# Patient Record
Sex: MALE | Race: WHITE | NOT HISPANIC OR LATINO | URBAN - METROPOLITAN AREA
[De-identification: names, ages, dates, MRNs, and addresses within clinical notes are randomized per-mention and may not be internally consistent; named-entity substitution may affect disease eponyms.]

---

## 2018-03-04 ENCOUNTER — EMERGENCY (EMERGENCY)
Facility: HOSPITAL | Age: 43
LOS: 1 days | Discharge: HOME | End: 2018-03-04
Attending: EMERGENCY MEDICINE | Admitting: EMERGENCY MEDICINE
Payer: COMMERCIAL

## 2018-03-04 VITALS
HEIGHT: 76 IN | RESPIRATION RATE: 16 BRPM | WEIGHT: 250 LBS | DIASTOLIC BLOOD PRESSURE: 81 MMHG | HEART RATE: 128 BPM | TEMPERATURE: 99 F | SYSTOLIC BLOOD PRESSURE: 117 MMHG | OXYGEN SATURATION: 96 %

## 2018-03-04 DIAGNOSIS — Z98.890 OTHER SPECIFIED POSTPROCEDURAL STATES: Chronic | ICD-10-CM

## 2018-03-04 LAB
ALBUMIN SERPL ELPH-MCNC: 3.9 G/DL — SIGNIFICANT CHANGE UP (ref 3.3–5)
ALP SERPL-CCNC: 53 U/L — SIGNIFICANT CHANGE UP (ref 30–120)
ALT FLD-CCNC: 145 U/L DA — HIGH (ref 10–60)
ANION GAP SERPL CALC-SCNC: 10 MMOL/L — SIGNIFICANT CHANGE UP (ref 5–17)
AST SERPL-CCNC: 57 U/L — HIGH (ref 10–40)
BASOPHILS # BLD AUTO: 0 K/UL — SIGNIFICANT CHANGE UP (ref 0–0.2)
BASOPHILS NFR BLD AUTO: 0.4 % — SIGNIFICANT CHANGE UP (ref 0–2)
BILIRUB SERPL-MCNC: 1 MG/DL — SIGNIFICANT CHANGE UP (ref 0.2–1.2)
BUN SERPL-MCNC: 18 MG/DL — SIGNIFICANT CHANGE UP (ref 7–23)
CALCIUM SERPL-MCNC: 8.9 MG/DL — SIGNIFICANT CHANGE UP (ref 8.4–10.5)
CHLORIDE SERPL-SCNC: 104 MMOL/L — SIGNIFICANT CHANGE UP (ref 96–108)
CO2 SERPL-SCNC: 25 MMOL/L — SIGNIFICANT CHANGE UP (ref 22–31)
CREAT SERPL-MCNC: 1.08 MG/DL — SIGNIFICANT CHANGE UP (ref 0.5–1.3)
EOSINOPHIL # BLD AUTO: 0 K/UL — SIGNIFICANT CHANGE UP (ref 0–0.5)
EOSINOPHIL NFR BLD AUTO: 0.2 % — SIGNIFICANT CHANGE UP (ref 0–6)
GLUCOSE SERPL-MCNC: 131 MG/DL — HIGH (ref 70–99)
HCT VFR BLD CALC: 46.6 % — SIGNIFICANT CHANGE UP (ref 39–50)
HGB BLD-MCNC: 15.8 G/DL — SIGNIFICANT CHANGE UP (ref 13–17)
LACTATE SERPL-SCNC: 1.5 MMOL/L — SIGNIFICANT CHANGE UP (ref 0.7–2)
LIDOCAIN IGE QN: 41 U/L — LOW (ref 73–393)
LYMPHOCYTES # BLD AUTO: 0.8 K/UL — LOW (ref 1–3.3)
LYMPHOCYTES # BLD AUTO: 9.4 % — LOW (ref 13–44)
MCHC RBC-ENTMCNC: 29.4 PG — SIGNIFICANT CHANGE UP (ref 27–34)
MCHC RBC-ENTMCNC: 33.8 GM/DL — SIGNIFICANT CHANGE UP (ref 32–36)
MCV RBC AUTO: 87 FL — SIGNIFICANT CHANGE UP (ref 80–100)
MONOCYTES # BLD AUTO: 0.4 K/UL — SIGNIFICANT CHANGE UP (ref 0–0.9)
MONOCYTES NFR BLD AUTO: 4.6 % — SIGNIFICANT CHANGE UP (ref 2–14)
NEUTROPHILS # BLD AUTO: 6.9 K/UL — SIGNIFICANT CHANGE UP (ref 1.8–7.4)
NEUTROPHILS NFR BLD AUTO: 85.4 % — HIGH (ref 43–77)
PLATELET # BLD AUTO: 146 K/UL — LOW (ref 150–400)
POTASSIUM SERPL-MCNC: 3.7 MMOL/L — SIGNIFICANT CHANGE UP (ref 3.5–5.3)
POTASSIUM SERPL-SCNC: 3.7 MMOL/L — SIGNIFICANT CHANGE UP (ref 3.5–5.3)
PROT SERPL-MCNC: 7.7 G/DL — SIGNIFICANT CHANGE UP (ref 6–8.3)
RBC # BLD: 5.36 M/UL — SIGNIFICANT CHANGE UP (ref 4.2–5.8)
RBC # FLD: 12 % — SIGNIFICANT CHANGE UP (ref 10.3–14.5)
SODIUM SERPL-SCNC: 139 MMOL/L — SIGNIFICANT CHANGE UP (ref 135–145)
WBC # BLD: 8.1 K/UL — SIGNIFICANT CHANGE UP (ref 3.8–10.5)
WBC # FLD AUTO: 8.1 K/UL — SIGNIFICANT CHANGE UP (ref 3.8–10.5)

## 2018-03-04 PROCEDURE — 83605 ASSAY OF LACTIC ACID: CPT

## 2018-03-04 PROCEDURE — 99284 EMERGENCY DEPT VISIT MOD MDM: CPT | Mod: 25

## 2018-03-04 PROCEDURE — 96361 HYDRATE IV INFUSION ADD-ON: CPT

## 2018-03-04 PROCEDURE — 96375 TX/PRO/DX INJ NEW DRUG ADDON: CPT

## 2018-03-04 PROCEDURE — 96374 THER/PROPH/DIAG INJ IV PUSH: CPT

## 2018-03-04 PROCEDURE — 83690 ASSAY OF LIPASE: CPT

## 2018-03-04 PROCEDURE — 93005 ELECTROCARDIOGRAM TRACING: CPT

## 2018-03-04 PROCEDURE — 85027 COMPLETE CBC AUTOMATED: CPT

## 2018-03-04 PROCEDURE — 80053 COMPREHEN METABOLIC PANEL: CPT

## 2018-03-04 PROCEDURE — 36415 COLL VENOUS BLD VENIPUNCTURE: CPT

## 2018-03-04 PROCEDURE — 93010 ELECTROCARDIOGRAM REPORT: CPT

## 2018-03-04 PROCEDURE — 99285 EMERGENCY DEPT VISIT HI MDM: CPT

## 2018-03-04 RX ORDER — ONDANSETRON 8 MG/1
1 TABLET, FILM COATED ORAL
Qty: 12 | Refills: 0
Start: 2018-03-04

## 2018-03-04 RX ORDER — SODIUM CHLORIDE 9 MG/ML
1000 INJECTION INTRAMUSCULAR; INTRAVENOUS; SUBCUTANEOUS ONCE
Qty: 0 | Refills: 0 | Status: COMPLETED | OUTPATIENT
Start: 2018-03-04 | End: 2018-03-04

## 2018-03-04 RX ORDER — SODIUM CHLORIDE 9 MG/ML
3 INJECTION INTRAMUSCULAR; INTRAVENOUS; SUBCUTANEOUS ONCE
Qty: 0 | Refills: 0 | Status: COMPLETED | OUTPATIENT
Start: 2018-03-04 | End: 2018-03-04

## 2018-03-04 RX ORDER — ONDANSETRON 8 MG/1
4 TABLET, FILM COATED ORAL ONCE
Qty: 0 | Refills: 0 | Status: COMPLETED | OUTPATIENT
Start: 2018-03-04 | End: 2018-03-04

## 2018-03-04 RX ORDER — KETOROLAC TROMETHAMINE 30 MG/ML
30 SYRINGE (ML) INJECTION ONCE
Qty: 0 | Refills: 0 | Status: DISCONTINUED | OUTPATIENT
Start: 2018-03-04 | End: 2018-03-04

## 2018-03-04 RX ORDER — FAMOTIDINE 10 MG/ML
20 INJECTION INTRAVENOUS ONCE
Qty: 0 | Refills: 0 | Status: COMPLETED | OUTPATIENT
Start: 2018-03-04 | End: 2018-03-04

## 2018-03-04 RX ADMIN — SODIUM CHLORIDE 1000 MILLILITER(S): 9 INJECTION INTRAMUSCULAR; INTRAVENOUS; SUBCUTANEOUS at 21:36

## 2018-03-04 RX ADMIN — ONDANSETRON 4 MILLIGRAM(S): 8 TABLET, FILM COATED ORAL at 21:36

## 2018-03-04 RX ADMIN — Medication 30 MILLIGRAM(S): at 21:53

## 2018-03-04 RX ADMIN — SODIUM CHLORIDE 3 MILLILITER(S): 9 INJECTION INTRAMUSCULAR; INTRAVENOUS; SUBCUTANEOUS at 21:37

## 2018-03-04 RX ADMIN — SODIUM CHLORIDE 1000 MILLILITER(S): 9 INJECTION INTRAMUSCULAR; INTRAVENOUS; SUBCUTANEOUS at 22:21

## 2018-03-04 RX ADMIN — FAMOTIDINE 20 MILLIGRAM(S): 10 INJECTION INTRAVENOUS at 21:53

## 2018-03-04 RX ADMIN — Medication 30 MILLIGRAM(S): at 22:25

## 2018-03-04 RX ADMIN — SODIUM CHLORIDE 1000 MILLILITER(S): 9 INJECTION INTRAMUSCULAR; INTRAVENOUS; SUBCUTANEOUS at 23:04

## 2018-03-04 NOTE — ED ADULT NURSE NOTE - OBJECTIVE STATEMENT
Patient presents to ED with complaints of nausea, vomiting and diarrhea since 3 am. As per patient has had about 7/8 episodes of emesis and 4episodes of non-bloody diarrhea. Patient reported feelings of palpitations earlier that have subsided. At present reports no chest pain or shortness of breath.

## 2018-03-04 NOTE — ED ADULT NURSE REASSESSMENT NOTE - NS ED NURSE REASSESS COMMENT FT1
IV access was established, IV fluids infusing as ordered and medication given as ordered. Awaiting disposition, nursing care ongoing.

## 2018-03-04 NOTE — ED PROVIDER NOTE - ATTENDING CONTRIBUTION TO CARE
Austin Woodson MD: I have personally performed a face to face diagnostic evaluation on this patient.  I have reviewed the PA note and agree with the history, exam, and plan of care, except as noted.  History and Exam by me shows same findings as documented  Attending note: Patient with v/d after other family members had same thing. No abdo pain. Has non-tender abdomen and otherwise unremarkable exam. Agree with plan

## 2018-03-04 NOTE — ED ADULT NURSE REASSESSMENT NOTE - NS ED NURSE REASSESS COMMENT FT1
Patient is resting comfortably, 2nd bag of fluids infusing, awaiting disposition, nursing care ongoing.

## 2018-03-04 NOTE — ED PROVIDER NOTE - OBJECTIVE STATEMENT
pt is a 43yo male with pmhx of hodgkin's disease c/o vomiting and diarrhea x today. pt reports multiple episodes of vomiting and diarrhea without blood with associate tactile fever and nausea. pt reports he tried to eat ice chips but was unable to tolerate. pt states when vomiting earlier he had palpitations which resolved.  pt reports wife and daughter had similar symptoms last week. pt [place] went [place] two weeks ago. pt denies cp, sob, abd pain, dysuria, rash.

## 2018-03-05 VITALS
HEART RATE: 100 BPM | OXYGEN SATURATION: 97 % | SYSTOLIC BLOOD PRESSURE: 137 MMHG | TEMPERATURE: 99 F | RESPIRATION RATE: 15 BRPM | DIASTOLIC BLOOD PRESSURE: 68 MMHG

## 2018-03-05 NOTE — ED ADULT NURSE REASSESSMENT NOTE - NS ED NURSE REASSESS COMMENT FT1
Patient was discharged in stable condition, instructions were provided and reviewed, demonstrated understanding, left AAOx4, ambulatory and unaccompanied. Vital signs stable.

## 2018-03-08 ENCOUNTER — RESULT REVIEW (OUTPATIENT)
Age: 43
End: 2018-03-08

## 2018-08-07 PROBLEM — C81.90 HODGKIN LYMPHOMA, UNSPECIFIED, UNSPECIFIED SITE: Chronic | Status: ACTIVE | Noted: 2018-03-04

## 2018-08-17 ENCOUNTER — OUTPATIENT (OUTPATIENT)
Dept: OUTPATIENT SERVICES | Facility: HOSPITAL | Age: 43
LOS: 1 days | End: 2018-08-17
Payer: COMMERCIAL

## 2018-08-17 ENCOUNTER — RESULT REVIEW (OUTPATIENT)
Age: 43
End: 2018-08-17

## 2018-08-17 DIAGNOSIS — K31.7 POLYP OF STOMACH AND DUODENUM: ICD-10-CM

## 2018-08-17 DIAGNOSIS — Z98.890 OTHER SPECIFIED POSTPROCEDURAL STATES: Chronic | ICD-10-CM

## 2018-08-17 PROCEDURE — 43251 EGD REMOVE LESION SNARE: CPT

## 2018-08-17 PROCEDURE — 43252 EGD OPTICAL ENDOMICROSCOPY: CPT

## 2018-08-17 PROCEDURE — C1889: CPT

## 2018-08-17 PROCEDURE — 43236 UPPR GI SCOPE W/SUBMUC INJ: CPT | Mod: XS

## 2018-08-17 PROCEDURE — 88305 TISSUE EXAM BY PATHOLOGIST: CPT

## 2018-08-17 PROCEDURE — 43239 EGD BIOPSY SINGLE/MULTIPLE: CPT | Mod: XS

## 2018-08-17 PROCEDURE — 88305 TISSUE EXAM BY PATHOLOGIST: CPT | Mod: 26

## 2018-08-21 LAB — SURGICAL PATHOLOGY STUDY: SIGNIFICANT CHANGE UP

## 2020-03-10 ENCOUNTER — EMERGENCY (EMERGENCY)
Facility: HOSPITAL | Age: 45
LOS: 1 days | Discharge: HOME | End: 2020-03-10
Attending: EMERGENCY MEDICINE | Admitting: EMERGENCY MEDICINE
Payer: COMMERCIAL

## 2020-03-10 VITALS
OXYGEN SATURATION: 98 % | HEART RATE: 88 BPM | DIASTOLIC BLOOD PRESSURE: 72 MMHG | RESPIRATION RATE: 16 BRPM | SYSTOLIC BLOOD PRESSURE: 134 MMHG | TEMPERATURE: 98 F

## 2020-03-10 VITALS
HEIGHT: 74 IN | DIASTOLIC BLOOD PRESSURE: 84 MMHG | OXYGEN SATURATION: 98 % | SYSTOLIC BLOOD PRESSURE: 140 MMHG | HEART RATE: 103 BPM | TEMPERATURE: 98 F | RESPIRATION RATE: 16 BRPM | WEIGHT: 250 LBS

## 2020-03-10 DIAGNOSIS — Z98.890 OTHER SPECIFIED POSTPROCEDURAL STATES: Chronic | ICD-10-CM

## 2020-03-10 PROCEDURE — 93971 EXTREMITY STUDY: CPT

## 2020-03-10 PROCEDURE — 93971 EXTREMITY STUDY: CPT | Mod: 26,RT

## 2020-03-10 PROCEDURE — 99284 EMERGENCY DEPT VISIT MOD MDM: CPT

## 2020-03-10 PROCEDURE — 99284 EMERGENCY DEPT VISIT MOD MDM: CPT | Mod: 25

## 2020-03-10 RX ORDER — ROSUVASTATIN CALCIUM 5 MG/1
1 TABLET ORAL
Qty: 0 | Refills: 0 | DISCHARGE

## 2020-03-10 NOTE — ED PROVIDER NOTE - PROGRESS NOTE DETAILS
Scribe AS for Dr. Xavier: 45 yo male presents to the ED c/o RLE pain, swelling and redness x 3 days. Pt states that yesterday he saw Dr. Deo Gilliland (orthopedic), was diagnosed with cellulitis and given abx and pain medication. Pt states that he feels the redness and pain has gotten worse since yesterday even after taking some doses of the abx. Pt was told to come to the ED if his symptoms became worse. PE: mild redness and swelling to right lower leg, anterior, no posterior calf pain or tenderness, full ROM intact, good pulses. Impression is RLE redness and swelling, r/o DVT. Plan is venous doppler. Discussed results with the patient and provided copies.  All questions were answered. Discussed the importance of prompt, close medical follow-up. Patient will return with any changes, concerns or persistent/worsening symptoms.  Patient verbalized understanding.

## 2020-03-10 NOTE — ED PROVIDER NOTE - NSFOLLOWUPINSTRUCTIONS_ED_ALL_ED_FT
Follow up with your primary care doctor, continue with your antibiotics. Return for any worsening symptoms. You should have a repeat Ultrasound in 1 week with your doctor. A primary care doctor was provided if you don't have primary care follow up.

## 2020-03-10 NOTE — ED PROVIDER NOTE - CARE PROVIDER_API CALL
Rios Felix (DO)  Family Medicine  [address] [place] [address]  Phone: [phone]  Fax: [phone]  Follow Up Time: 1-3 Days

## 2020-03-10 NOTE — ED PROVIDER NOTE - OBJECTIVE STATEMENT
45 y/o male with PMHx Hodgkin's Lymphoma with chemo done in 1998 sent in by his orthopedic to rule out DVT. pt reports pain to anterior shin x 2 days. describes as aching, non-radiating, constant, and 7/10. pt notes he went to his orthopedic in which xrays were normal, but he was concerned for DVT. pt notes travel to [place] in 02/[place], 3 hour flight. Pt notes he was given Cefadroxil to cover infection as it was slightly red and warmth. pt denies fever, chills, trauma/fall, numbness/weakness, open wounds, hx of DVT, CP, SOB, or any other complaints.

## 2020-03-10 NOTE — ED PROVIDER NOTE - PATIENT PORTAL LINK FT
You can access the FollowMyHealth Patient Portal offered by [facility] by registering at the following website: [email or web address]. By joining [employer]’s FollowMyHealth portal, you will also be able to view your health information using other applications (apps) compatible with our system.

## 2020-03-10 NOTE — ED PROVIDER NOTE - PHYSICAL EXAMINATION
Constitutional: Awake, Alert, non-toxic. NAD. Well appearing, well nourished.   HEAD: Normocephalic, atraumatic.   EYES: EOM intact, conjunctiva and sclera are clear bilaterally. No raccoon eyes.   ENT: No rhinorrhea, mucous membranes pink/moist  NECK: Supple, non-tender  CARDIOVASCULAR: Normal S1, S2; regular rate and rhythm.  RESPIRATORY: Normal respiratory effort; breath sounds CTAB, no wheezes, rhonchi, or rales. Speaking in full sentences. No accessory muscle use.   ABDOMEN: Soft; non-tender, non-distended   EXTREMITIES: Full passive and active ROM in all extremities; (+) anterior shin TTP, swelling and Erythema, negative roma sign; distal pulses palpable and symmetric, no crepitus or step off  SKIN: Warm, dry; good skin turgor, no apparent lesions or rashes, no ecchymosis, brisk capillary refill.  NEURO: A&O x3. Sensory and motor functions are grossly intact. Speech is normal. Appearance and judgement seem appropriate for gender and age. No neurological deficits. Neurovascular sensation intact motor function 5/5 of upper and lower extremities

## 2020-03-10 NOTE — ED ADULT TRIAGE NOTE - CHIEF COMPLAINT QUOTE
"My RLE is painful for past 3 days." Seen by Ortho yesterday- started on antibiotic & advised to have doppler study done

## 2020-03-10 NOTE — ED ADULT NURSE NOTE - NSIMPLEMENTINTERV_GEN_ALL_ED
Implemented All Universal Safety Interventions:  [place] to call system. Call bell, personal items and telephone within reach. Instruct patient to call for assistance. Room bathroom lighting operational. Non-slip footwear when patient is off stretcher. Physically safe environment: no spills, clutter or unnecessary equipment. Stretcher in lowest position, wheels locked, appropriate side rails in place.

## 2020-03-12 ENCOUNTER — APPOINTMENT (OUTPATIENT)
Dept: FAMILY MEDICINE | Facility: CLINIC | Age: 45
End: 2020-03-12
Payer: COMMERCIAL

## 2020-03-12 VITALS
RESPIRATION RATE: 15 BRPM | DIASTOLIC BLOOD PRESSURE: 91 MMHG | TEMPERATURE: 98.1 F | HEART RATE: 106 BPM | OXYGEN SATURATION: 95 % | SYSTOLIC BLOOD PRESSURE: 142 MMHG

## 2020-03-12 DIAGNOSIS — Z00.00 ENCOUNTER FOR GENERAL ADULT MEDICAL EXAMINATION W/OUT ABNORMAL FINDINGS: ICD-10-CM

## 2020-03-12 DIAGNOSIS — Z82.49 FAMILY HISTORY OF ISCHEMIC HEART DISEASE AND OTHER DISEASES OF THE CIRCULATORY SYSTEM: ICD-10-CM

## 2020-03-12 DIAGNOSIS — L65.9 NONSCARRING HAIR LOSS, UNSPECIFIED: ICD-10-CM

## 2020-03-12 DIAGNOSIS — Z78.9 OTHER SPECIFIED HEALTH STATUS: ICD-10-CM

## 2020-03-12 DIAGNOSIS — Z85.71 PERSONAL HISTORY OF HODGKIN LYMPHOMA: ICD-10-CM

## 2020-03-12 DIAGNOSIS — L03.115 CELLULITIS OF RIGHT LOWER LIMB: ICD-10-CM

## 2020-03-12 PROCEDURE — 99213 OFFICE O/P EST LOW 20 MIN: CPT | Mod: 25

## 2020-03-12 PROCEDURE — 99386 PREV VISIT NEW AGE 40-64: CPT

## 2020-03-12 RX ORDER — FINASTERIDE 1 MG/1
1 TABLET, FILM COATED ORAL DAILY
Refills: 0 | Status: ACTIVE | COMMUNITY

## 2020-03-12 RX ORDER — TRAMADOL HYDROCHLORIDE 50 MG/1
50 TABLET, COATED ORAL 3 TIMES DAILY
Qty: 15 | Refills: 0 | Status: ACTIVE | COMMUNITY
Start: 2020-03-12 | End: 1900-01-01

## 2020-03-12 RX ORDER — CEFADROXIL 500 MG/1
500 CAPSULE ORAL TWICE DAILY
Refills: 0 | Status: ACTIVE | COMMUNITY

## 2020-03-12 RX ORDER — ASPIRIN 81 MG/1
81 TABLET ORAL
Refills: 0 | Status: ACTIVE | COMMUNITY

## 2020-03-12 NOTE — PHYSICAL EXAM
[No Acute Distress] : no acute distress [Well Nourished] : well nourished [Well Developed] : well developed [Normal Sclera/Conjunctiva] : normal sclera/conjunctiva [PERRL] : pupils equal round and reactive to light [EOMI] : extraocular movements intact [Normal Outer Ear/Nose] : the outer ears and nose were normal in appearance [Normal Oropharynx] : the oropharynx was normal [Normal TMs] : both tympanic membranes were normal [No JVD] : no jugular venous distention [No Lymphadenopathy] : no lymphadenopathy [Supple] : supple [No Respiratory Distress] : no respiratory distress  [No Accessory Muscle Use] : no accessory muscle use [Clear to Auscultation] : lungs were clear to auscultation bilaterally [Normal Rate] : normal rate  [Regular Rhythm] : with a regular rhythm [Normal S1, S2] : normal S1 and S2 [No Murmur] : no murmur heard [No Carotid Bruits] : no carotid bruits [No Abdominal Bruit] : a ~M bruit was not heard ~T in the abdomen [No Edema] : there was no peripheral edema [Soft] : abdomen soft [Non Tender] : non-tender [No HSM] : no HSM [Normal Bowel Sounds] : normal bowel sounds [No CVA Tenderness] : no CVA  tenderness [No Spinal Tenderness] : no spinal tenderness [Coordination Grossly Intact] : coordination grossly intact [No Focal Deficits] : no focal deficits [Normal Gait] : normal gait [Normal Affect] : the affect was normal [Normal Insight/Judgement] : insight and judgment were intact [de-identified] : Mildly erythematous on R anterior shin and just inferior to R anterior knee cap. Well healing thin 2" scab. Mildly warmer on R anterior lower extremity compared to LLE. Tenderness to palpation on R anterior LE. Pain with weightbearing but able to ambulate without difficulty. Full range of motion. Pain on R anterior shin with passive ROM of knee. [de-identified] : Mildly erythematous RLE.

## 2020-03-12 NOTE — HEALTH RISK ASSESSMENT
[Intercurrent ED visits] : went to ED [Yes] : Yes [Monthly or less (1 pt)] : Monthly or less (1 point) [1 or 2 (0 pts)] : 1 or 2 (0 points) [Less than monthly (1 pt)] : Less than monthly (1 point) [No] : In the past 12 months have you used drugs other than those required for medical reasons? No [No falls in past year] : Patient reported no falls in the past year [0] : 2) Feeling down, depressed, or hopeless: Not at all (0) [HIV test declined] : HIV test declined [Hepatitis C test declined] : Hepatitis C test declined [None] : None [With Significant Other] : lives with significant other [# of Members in Household ___] :  household currently consist of [unfilled] member(s) [Employed] : employed [High School] : high school [] :  [# Of Children ___] : has [unfilled] children [Sexually Active] : sexually active [Fully functional (bathing, dressing, toileting, transferring, walking, feeding)] : Fully functional (bathing, dressing, toileting, transferring, walking, feeding) [Fully functional (using the telephone, shopping, preparing meals, housekeeping, doing laundry, using] : Fully functional and needs no help or supervision to perform IADLs (using the telephone, shopping, preparing meals, housekeeping, doing laundry, using transportation, managing medications and managing finances) [Reports changes in dental health] : Reports changes in dental health [Smoke Detector] : smoke detector [Carbon Monoxide Detector] : carbon monoxide detector [Guns at Home] : guns at home [] : No [de-identified] : 3/10/2020 [place] ED for R anterior shin pain and erythema [de-identified] : Patient denies exercise. Reports job is labor-intensive (). [de-identified] : Patient reports that he has been eating healthy for the last month due to high cholesterol (salad, chicken). Although he reports that prior to 1 month, his diet has been "very bad." Patient fluctuates between eating healthy and unhealthy. [Change in mental status noted] : No change in mental status noted [Language] : denies difficulty with language [Behavior] : denies difficulty with behavior [Reports changes in hearing] : Reports no changes in hearing [Reports changes in vision] : Reports no changes in vision [Seat Belt] : does not use seat belt [FreeTextEntry2] :  [de-identified] : [employer] school

## 2020-03-12 NOTE — ASSESSMENT
[FreeTextEntry1] : Ricardo Lester is a 44 year old male with PMH of Hodgkin Lymphoma (chemotherapy and radiation 1998) of R anterior shin pain and erythema since 3/7 who presented with RLE cellulitis.

## 2020-03-12 NOTE — REVIEW OF SYSTEMS
[Palpitations] : palpitations [Heartburn] : heartburn [Joint Pain] : joint pain [Back Pain] : back pain [Negative] : Heme/Lymph [Fever] : no fever [Chills] : no chills [Night Sweats] : no night sweats [Discharge] : no discharge [Pain] : no pain [Vision Problems] : no vision problems [Itching] : no itching [Earache] : no earache [Hearing Loss] : no hearing loss [Nasal Discharge] : no nasal discharge [Sore Throat] : no sore throat [Chest Pain] : no chest pain [Orthopena] : no orthopnea [Shortness Of Breath] : no shortness of breath [Wheezing] : no wheezing [Cough] : no cough [Abdominal Pain] : no abdominal pain [Nausea] : no nausea [Constipation] : no constipation [Vomiting] : no vomiting [Dysuria] : no dysuria [Incontinence] : no incontinence [Hematuria] : no hematuria [Frequency] : no frequency [Joint Stiffness] : no joint stiffness [Joint Swelling] : no joint swelling [Skin Rash] : no skin rash [Headache] : no headache [Dizziness] : no dizziness [FreeTextEntry5] : Intermittent palpations 1x/month, especially with high caffeine intake. Patient worked up at [facility] (stress test). [FreeTextEntry7] : Heartburn when diet is poor. [FreeTextEntry9] : Lower back pain. R knee pain, R shin pain.

## 2020-03-12 NOTE — HISTORY OF PRESENT ILLNESS
[FreeTextEntry1] : Ricardo Lester is a 44 year old male with PMH of Hodgkin Lymphoma (chemotherapy and radiation 1998) of R anterior shin pain and erythema since 3/7. [de-identified] : Ricardo Lester is a 44 year old male with PMH of Hodgkin Lymphoma (chemotherapy and radiation 1998) of R anterior shin pain and erythema since 3/7. Patient first experienced pain when he kneeled down on 3/7 helping his son put on his ski boot. Patient was evaluated by orthopedist on 3/9, Dr. Deo Gilliland who performed an x-ray, which showed no fracture. Orthopedist prescribed patient cefadroxil 500 mg PO BID and a pain medication (Meloxicam). Patient went to [place] ED on 3/10 for increasing pain and erythema of R anterior shin. An ultrasound was performed which ruled out DVT of the RLE. Patient reports that pain and erythema has been travelling and is now located in R knee. Patient reports pain with weight bearing. Patient reports pain severity 5-6/10 and has the most severe pain in the morning. Stepping on gas while driving and standing increases pain. Described discomfort as "tightness." Reports 3 small scab on R anterior shin. Patient reports that peak pain was yesterday, which has slightly improved today. Denies any bug bites or trauma. Denies leg edema, fevers/chills. Denies sick contacts. \par \par \par \par

## 2020-03-12 NOTE — PLAN
[FreeTextEntry1] : 1. Cellulitis\par -Counseled patient that patient likely has cellulitis and will take 10-14 days to resolve.\par -Continue cefadroxil 500 mg PO BID (started 3/9/2020) for 10 days.\par -Will prescribe Tramadol PO q8h prn x 5 days for pain.\par \par 2. General Health Maintenance \par -Routine blood work: CBC, CMP, Lipid panel.

## 2021-06-28 ENCOUNTER — EMERGENCY (EMERGENCY)
Facility: HOSPITAL | Age: 46
LOS: 1 days | Discharge: HOME | End: 2021-06-28
Attending: EMERGENCY MEDICINE | Admitting: EMERGENCY MEDICINE
Payer: COMMERCIAL

## 2021-06-28 VITALS
TEMPERATURE: 98 F | OXYGEN SATURATION: 96 % | DIASTOLIC BLOOD PRESSURE: 95 MMHG | HEART RATE: 92 BPM | SYSTOLIC BLOOD PRESSURE: 164 MMHG

## 2021-06-28 VITALS
TEMPERATURE: 99 F | OXYGEN SATURATION: 99 % | RESPIRATION RATE: 16 BRPM | WEIGHT: 250 LBS | HEIGHT: 74 IN | SYSTOLIC BLOOD PRESSURE: 150 MMHG | DIASTOLIC BLOOD PRESSURE: 70 MMHG | HEART RATE: 98 BPM

## 2021-06-28 DIAGNOSIS — Z98.890 OTHER SPECIFIED POSTPROCEDURAL STATES: Chronic | ICD-10-CM

## 2021-06-28 PROCEDURE — 99284 EMERGENCY DEPT VISIT MOD MDM: CPT

## 2021-06-28 PROCEDURE — 72131 CT LUMBAR SPINE W/O DYE: CPT

## 2021-06-28 PROCEDURE — 99284 EMERGENCY DEPT VISIT MOD MDM: CPT | Mod: 25

## 2021-06-28 PROCEDURE — 93971 EXTREMITY STUDY: CPT

## 2021-06-28 PROCEDURE — 93971 EXTREMITY STUDY: CPT | Mod: 26,LT

## 2021-06-28 PROCEDURE — 72131 CT LUMBAR SPINE W/O DYE: CPT | Mod: 26,MA

## 2021-06-28 PROCEDURE — 96374 THER/PROPH/DIAG INJ IV PUSH: CPT

## 2021-06-28 RX ORDER — DEXAMETHASONE 0.5 MG/5ML
10 ELIXIR ORAL ONCE
Refills: 0 | Status: COMPLETED | OUTPATIENT
Start: 2021-06-28 | End: 2021-06-28

## 2021-06-28 RX ORDER — ASPIRIN/CALCIUM CARB/MAGNESIUM 324 MG
1 TABLET ORAL
Qty: 0 | Refills: 0 | DISCHARGE

## 2021-06-28 RX ORDER — CYCLOBENZAPRINE HYDROCHLORIDE 10 MG/1
1 TABLET, FILM COATED ORAL
Qty: 15 | Refills: 0
Start: 2021-06-28 | End: 2021-07-02

## 2021-06-28 RX ORDER — CYCLOBENZAPRINE HYDROCHLORIDE 10 MG/1
10 TABLET, FILM COATED ORAL ONCE
Refills: 0 | Status: COMPLETED | OUTPATIENT
Start: 2021-06-28 | End: 2021-06-28

## 2021-06-28 RX ORDER — LIDOCAINE 4 G/100G
1 CREAM TOPICAL
Qty: 10 | Refills: 0
Start: 2021-06-28 | End: 2021-07-07

## 2021-06-28 RX ORDER — PIROXICAM 20 MG/1
1 CAPSULE ORAL
Qty: 0 | Refills: 0 | DISCHARGE

## 2021-06-28 RX ADMIN — Medication 102 MILLIGRAM(S): at 12:15

## 2021-06-28 RX ADMIN — CYCLOBENZAPRINE HYDROCHLORIDE 10 MILLIGRAM(S): 10 TABLET, FILM COATED ORAL at 12:07

## 2021-06-28 NOTE — ED PROVIDER NOTE - PROGRESS NOTE DETAILS
pt with improvement of symptoms. Results reviewed pt made aware of disc herniations. Advised will need to follow up with orthopedics for a recheck and may need for possible MRI. Advised to continue Medrol Dose Pack, will give Flexeril and topical Lidoderm patch. Remain NVI. Advised will need follow up with his orthopedics Dr. Gilliland
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
## 2021-06-28 NOTE — ED PROVIDER NOTE - PATIENT PORTAL LINK FT
You can access the FollowMyHealth Patient Portal offered by [facility] by registering at the following website: [email or web address]. By joining [facility]’s FollowMyHealth portal, you will also be able to view your health information using other applications (apps) compatible with our system.
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
## 2021-06-28 NOTE — ED PROVIDER NOTE - PHYSICAL EXAMINATION
no midline C/T/L TTP  TTP overlying left gluteal region no skin rashes noted  no mark TTP to left hip, femur, knee, tib/fib, ankle, foot. TTP to mid medial calf with no overlying skin changes. +pedal pulse cap refill less then 2 seconds.   Sensation grossly intact to LLE  NO TTP to calcaneal region   Achilles reflex intact to LLE
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
## 2021-06-28 NOTE — ED PROVIDER NOTE - CARE PLAN
Principal Discharge DX:	Acute left-sided low back pain with left-sided sciatica  Secondary Diagnosis:	Lumbar disc herniation with radiculopathy

## 2022-11-08 NOTE — ED PROVIDER NOTE - NSFOLLOWUPINSTRUCTIONS_ED_ALL_ED_FT
98.2
Lumbar Disc Herniation    WHAT YOU NEED TO KNOW:    Lumbar disc herniation occurs when a disc in your lumbar spine (lower back) bulges out. Lumbar discs are spongy cushions between the vertebrae (bones) in your spine. The herniated disc may press on your nerves or spinal cord.     Lumbar Disc Herniation         DISCHARGE INSTRUCTIONS:    Seek care immediately if:   •You cannot control when you urinate or have a bowel movement.      •You are unable to move one or both of your legs.      •You lose feeling in your groin or buttocks.      Contact your healthcare provider if:   •You have numbness in one or both of your legs.      •You have low back pain while resting.      •You have trouble moving one or both of your legs.      •You begin leaking urine or bowel movement, and it is not normal for you.      •Your pain gets worse, even after you take medicine.      •You have questions or concerns about your condition or care.      Medicines: You may need any of the following:   •NSAIDs, such as ibuprofen, help decrease swelling, pain, and fever. NSAIDs can cause stomach bleeding or kidney problems in certain people. If you take blood thinner medicine, always ask your healthcare provider if NSAIDs are safe for you. Always read the medicine label and follow directions.      •Prescription pain medicine may be given. Ask how to take this medicine safely.      •Muscle relaxers decrease pain and muscle spasms.       •Take your medicine as directed. Contact your healthcare provider if you think your medicine is not helping or if you have side effects. Tell him or her if you are allergic to any medicine. Keep a list of the medicines, vitamins, and herbs you take. Include the amounts, and when and why you take them. Bring the list or the pill bottles to follow-up visits. Carry your medicine list with you in case of an emergency.      Rest: Your healthcare provider may have you rest in bed for a few days. It is best to rest on your side with your knees bent. Put a cushion between your knees to help decrease the pressure on your spine and nerves. Ask how long you should rest and when you can return to your daily activities.    Heat: Apply heat on your lower back for 20 to 30 minutes every 2 hours for as many days as directed. Heat helps decrease pain and muscle spasms.    Physical therapy: A physical therapist teaches you exercises to help improve movement and strength, and to decrease pain. A physical therapist can teach you safe ways to bend, lift, sit, and stand to help relieve back pain.     Exercise and activity: Exercises that do not stress your back muscles may help decrease your pain. Examples of low-stress exercises are walking, swimming, and biking. Avoid heavy lifting while your back is healing. Try not to sit for long periods of time. Talk to your healthcare provider before you start any new exercise program.    Follow up with your healthcare provider as directed: Write down your questions so you remember to ask them during your visits.

## 2023-03-13 ENCOUNTER — APPOINTMENT (OUTPATIENT)
Dept: ORTHOPEDIC SURGERY | Facility: CLINIC | Age: 48
End: 2023-03-13
Payer: COMMERCIAL

## 2023-03-13 VITALS — BODY MASS INDEX: 28.98 KG/M2 | HEIGHT: 76 IN | WEIGHT: 238 LBS

## 2023-03-13 DIAGNOSIS — M77.8 OTHER ENTHESOPATHIES, NOT ELSEWHERE CLASSIFIED: ICD-10-CM

## 2023-03-13 DIAGNOSIS — E78.00 PURE HYPERCHOLESTEROLEMIA, UNSPECIFIED: ICD-10-CM

## 2023-03-13 PROCEDURE — 99214 OFFICE O/P EST MOD 30 MIN: CPT

## 2023-03-13 PROCEDURE — 73080 X-RAY EXAM OF ELBOW: CPT | Mod: RT

## 2023-03-13 RX ORDER — PIROXICAM 20 MG/1
20 CAPSULE ORAL
Qty: 30 | Refills: 4 | Status: ACTIVE | COMMUNITY
Start: 2023-03-13 | End: 1900-01-01

## 2023-03-13 NOTE — PHYSICAL EXAM
[Normal Mood and Affect] : normal mood and affect [Able to Communicate] : able to communicate [Well Developed] : well developed [Well Nourished] : well nourished [NL (150)] : flexion 150 degrees [NL (0)] : extension 0 degrees [NL (90)] : supination 90 degrees [5___] : supination 5[unfilled]/5 [Right] : right elbow [There are no fractures, subluxations or dislocations. No significant abnormalities are seen] : There are no fractures, subluxations or dislocations. No significant abnormalities are seen [de-identified] : average [] : no pain with valgus stress to elbow [de-identified] : tender over distal biceps only. [de-identified] : pain resistance to supination only [FreeTextEntry1] : Suture anchor.

## 2023-03-13 NOTE — HISTORY OF PRESENT ILLNESS
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 No hx of trauma. Hurts only making a biceps curl. Taking no nsaids.\par \par PMH: NO prior biceps complaints. [] : no [FreeTextEntry1] : right elbow [de-identified] : no treatment [de-identified] : self

## 2023-05-02 ENCOUNTER — APPOINTMENT (OUTPATIENT)
Dept: ORTHOPEDIC SURGERY | Facility: CLINIC | Age: 48
End: 2023-05-02
Payer: COMMERCIAL

## 2023-05-02 VITALS — BODY MASS INDEX: 28.74 KG/M2 | HEIGHT: 76 IN | WEIGHT: 236 LBS

## 2023-05-02 DIAGNOSIS — M25.551 PAIN IN RIGHT HIP: ICD-10-CM

## 2023-05-02 DIAGNOSIS — E11.9 TYPE 2 DIABETES MELLITUS W/OUT COMPLICATIONS: ICD-10-CM

## 2023-05-02 DIAGNOSIS — M87.051 IDIOPATHIC ASEPTIC NECROSIS OF RIGHT FEMUR: ICD-10-CM

## 2023-05-02 PROCEDURE — 73503 X-RAY EXAM HIP UNI 4/> VIEWS: CPT | Mod: RT

## 2023-05-02 PROCEDURE — 99214 OFFICE O/P EST MOD 30 MIN: CPT

## 2023-05-02 NOTE — HISTORY OF PRESENT ILLNESS
[5] : 5 [0] : 0 [Throbbing] : throbbing [Occasional] : occasional [Full time] : Work status: full time [de-identified] : 05/02/23:  Return visit for this 47 year male here today after an incidental finding on a CT abdomen and pelvis that he had one day ago, of right hip pathology and he is here for evaluation.  Says that he occasionally feels some buckling of his right hip.  States a remote history of Non-Hodgkin's lymphoma and did take prednisone for that.\par \par CT SCAN ABDOMEN AND PELVIS 05/01/23\par Suggestion of avascular necrosis of the right femoral head. [] : no [FreeTextEntry1] : right hip [FreeTextEntry7] : to right knee [FreeTextEntry9] : no treatment [de-identified] : none [de-identified] : none [de-identified] : self

## 2023-05-02 NOTE — PHYSICAL EXAM
[Normal Mood and Affect] : normal mood and affect [Able to Communicate] : able to communicate [Well Developed] : well developed [Well Nourished] : well nourished [5___] : adduction 5[unfilled]/5 [Right] : right hip with pelvis [AP] : anteroposterior [Lateral] : lateral [AVN] : AVN [] : non-antalgic [FreeTextEntry9] : radiosclerotic wedge along subchondral margin rt femoral head only. No signs of collapse. Stage 2??

## 2023-10-12 ENCOUNTER — APPOINTMENT (OUTPATIENT)
Dept: ENDOCRINOLOGY | Facility: CLINIC | Age: 48
End: 2023-10-12
Payer: COMMERCIAL

## 2023-10-12 VITALS
BODY MASS INDEX: 32.15 KG/M2 | WEIGHT: 264 LBS | HEIGHT: 76 IN | HEART RATE: 93 BPM | DIASTOLIC BLOOD PRESSURE: 90 MMHG | OXYGEN SATURATION: 97 % | SYSTOLIC BLOOD PRESSURE: 145 MMHG

## 2023-10-12 DIAGNOSIS — Z86.79 PERSONAL HISTORY OF OTHER DISEASES OF THE CIRCULATORY SYSTEM: ICD-10-CM

## 2023-10-12 LAB — HBA1C MFR BLD HPLC: 6.7

## 2023-10-12 PROCEDURE — 99204 OFFICE O/P NEW MOD 45 MIN: CPT | Mod: 25

## 2023-10-12 PROCEDURE — 83036 HEMOGLOBIN GLYCOSYLATED A1C: CPT | Mod: QW

## 2023-10-12 RX ORDER — ELECTROLYTES/DEXTROSE
32G X 4 MM SOLUTION, ORAL ORAL
Qty: 1 | Refills: 2 | Status: ACTIVE | COMMUNITY
Start: 2023-10-12 | End: 1900-01-01

## 2023-10-12 RX ORDER — LANCING DEVICE
EACH MISCELLANEOUS 3 TIMES DAILY
Qty: 270 | Refills: 2 | Status: ACTIVE | COMMUNITY
Start: 2023-10-12 | End: 1900-01-01

## 2023-10-12 RX ORDER — CHOLECALCIFEROL (VITAMIN D3) 10(400)/ML
DROPS ORAL
Qty: 270 | Refills: 2 | Status: ACTIVE | COMMUNITY
Start: 2023-10-12 | End: 1900-01-01

## 2023-11-30 ENCOUNTER — OFFICE (OUTPATIENT)
Dept: URBAN - METROPOLITAN AREA CLINIC 109 | Facility: CLINIC | Age: 48
Setting detail: OPHTHALMOLOGY
End: 2023-11-30
Payer: COMMERCIAL

## 2023-11-30 DIAGNOSIS — T15.01XA: ICD-10-CM

## 2023-11-30 PROCEDURE — 65222 REMOVE FOREIGN BODY FROM EYE: CPT | Mod: RT | Performed by: OPHTHALMOLOGY

## 2023-11-30 ASSESSMENT — CORNEAL TRAUMA - FOREIGN BODY: OD_FOREIGNBODY: METALLIC W/RUST RING PRESENT

## 2023-11-30 ASSESSMENT — LID EXAM ASSESSMENTS
OS_COMMENTS: NO FOREIGN BODY WITH LID EVERTED
OD_COMMENTS: NO FOREIGN BODY WITH LID EVERTED

## 2023-11-30 ASSESSMENT — SPHEQUIV_DERIVED
OD_SPHEQUIV: -0.25
OS_SPHEQUIV: -1

## 2023-11-30 ASSESSMENT — REFRACTION_AUTOREFRACTION
OD_AXIS: 107
OS_CYLINDER: -1.00
OD_CYLINDER: -0.50
OD_SPHERE: 0.00
OS_SPHERE: -0.50
OS_AXIS: 167

## 2023-11-30 ASSESSMENT — CONFRONTATIONAL VISUAL FIELD TEST (CVF)
OD_FINDINGS: FULL
OS_FINDINGS: FULL

## 2023-12-01 ENCOUNTER — OFFICE (OUTPATIENT)
Dept: URBAN - METROPOLITAN AREA CLINIC 109 | Facility: CLINIC | Age: 48
Setting detail: OPHTHALMOLOGY
End: 2023-12-01
Payer: COMMERCIAL

## 2023-12-01 ENCOUNTER — RX ONLY (RX ONLY)
Age: 48
End: 2023-12-01

## 2023-12-01 DIAGNOSIS — T15.02XD: ICD-10-CM

## 2023-12-01 PROCEDURE — 99213 OFFICE O/P EST LOW 20 MIN: CPT | Performed by: OPHTHALMOLOGY

## 2023-12-01 ASSESSMENT — CONFRONTATIONAL VISUAL FIELD TEST (CVF)
OS_FINDINGS: FULL
OD_FINDINGS: FULL

## 2023-12-01 ASSESSMENT — SPHEQUIV_DERIVED
OD_SPHEQUIV: -0.25
OS_SPHEQUIV: -1

## 2023-12-01 ASSESSMENT — REFRACTION_AUTOREFRACTION
OS_SPHERE: -0.50
OS_AXIS: 167
OD_AXIS: 107
OD_CYLINDER: -0.50
OS_CYLINDER: -1.00
OD_SPHERE: 0.00

## 2023-12-01 ASSESSMENT — CORNEAL TRAUMA - FOREIGN BODY: OS_FOREIGNBODY: METALLIC W/RUST RING PRESENT

## 2023-12-04 ENCOUNTER — APPOINTMENT (OUTPATIENT)
Dept: ENDOCRINOLOGY | Facility: CLINIC | Age: 48
End: 2023-12-04
Payer: COMMERCIAL

## 2023-12-04 VITALS
OXYGEN SATURATION: 96 % | SYSTOLIC BLOOD PRESSURE: 133 MMHG | WEIGHT: 257 LBS | TEMPERATURE: 98 F | HEART RATE: 95 BPM | DIASTOLIC BLOOD PRESSURE: 84 MMHG | BODY MASS INDEX: 31.28 KG/M2

## 2023-12-04 DIAGNOSIS — E78.5 HYPERLIPIDEMIA, UNSPECIFIED: ICD-10-CM

## 2023-12-04 PROCEDURE — 99214 OFFICE O/P EST MOD 30 MIN: CPT

## 2024-01-08 ENCOUNTER — APPOINTMENT (OUTPATIENT)
Dept: ENDOCRINOLOGY | Facility: CLINIC | Age: 49
End: 2024-01-08
Payer: COMMERCIAL

## 2024-01-08 ENCOUNTER — NON-APPOINTMENT (OUTPATIENT)
Age: 49
End: 2024-01-08

## 2024-01-08 PROCEDURE — 99211 OFF/OP EST MAY X REQ PHY/QHP: CPT

## 2024-01-11 ENCOUNTER — OFFICE (OUTPATIENT)
Dept: URBAN - METROPOLITAN AREA CLINIC 109 | Facility: CLINIC | Age: 49
Setting detail: OPHTHALMOLOGY
End: 2024-01-11
Payer: COMMERCIAL

## 2024-01-11 DIAGNOSIS — E11.9: ICD-10-CM

## 2024-01-11 DIAGNOSIS — H40.053: ICD-10-CM

## 2024-01-11 PROCEDURE — 76514 ECHO EXAM OF EYE THICKNESS: CPT | Performed by: OPHTHALMOLOGY

## 2024-01-11 PROCEDURE — 92133 CPTRZD OPH DX IMG PST SGM ON: CPT | Performed by: OPHTHALMOLOGY

## 2024-01-11 PROCEDURE — 92014 COMPRE OPH EXAM EST PT 1/>: CPT | Performed by: OPHTHALMOLOGY

## 2024-01-11 ASSESSMENT — CONFRONTATIONAL VISUAL FIELD TEST (CVF)
OD_FINDINGS: FULL
OS_FINDINGS: FULL

## 2024-01-11 ASSESSMENT — SPHEQUIV_DERIVED
OS_SPHEQUIV: -0.25
OD_SPHEQUIV: 0.125

## 2024-01-11 ASSESSMENT — REFRACTION_AUTOREFRACTION
OD_CYLINDER: -0.75
OD_SPHERE: +0.50
OD_AXIS: 106
OS_CYLINDER: -0.50
OS_SPHERE: 0.00
OS_AXIS: 068

## 2024-01-11 ASSESSMENT — CORNEAL TRAUMA - FOREIGN BODY: OS_FOREIGNBODY: ABSENT

## 2024-01-12 RX ORDER — BLOOD-GLUCOSE SENSOR
EACH MISCELLANEOUS
Qty: 3 | Refills: 4 | Status: ACTIVE | COMMUNITY
Start: 2024-01-12 | End: 1900-01-01

## 2024-01-12 RX ORDER — BLOOD-GLUCOSE,RECEIVER,CONT
EACH MISCELLANEOUS
Qty: 1 | Refills: 0 | Status: DISCONTINUED | COMMUNITY
Start: 2023-12-04 | End: 2024-01-12

## 2024-01-12 RX ORDER — BLOOD-GLUCOSE TRANSMITTER
EACH MISCELLANEOUS
Qty: 1 | Refills: 2 | Status: ACTIVE | COMMUNITY
Start: 2024-01-12 | End: 1900-01-01

## 2024-01-12 RX ORDER — BLOOD-GLUCOSE,RECEIVER,CONT
EACH MISCELLANEOUS
Qty: 1 | Refills: 0 | Status: ACTIVE | COMMUNITY
Start: 2024-01-12 | End: 1900-01-01

## 2024-01-12 RX ORDER — BLOOD-GLUCOSE SENSOR
EACH MISCELLANEOUS
Qty: 6 | Refills: 3 | Status: DISCONTINUED | COMMUNITY
Start: 2023-12-04 | End: 2024-01-12

## 2024-01-17 NOTE — END OF VISIT
Addended by: MYRON DIAL on: 10/23/2019 03:38 PM     Modules accepted: Orders    
[Time Spent: ___ minutes] : I have spent [unfilled] minutes of time on the encounter.

## 2024-01-17 NOTE — HISTORY OF PRESENT ILLNESS
[FreeTextEntry1] : 48 year old gentleman with PMH of Hodgkin's lymphoma, T2DM, HLD, presents for management of his diabetes.   Patient was supposed to be seen in Feb 2024, but made an earlier appointment due to concerns regarding his BGs.   #Hodgkin stage IIB, nodular sclerosing -Dx age 23 1/23/98 -Off treatment >23 years -Treatment Rtx 7/28/98-8/13/98 mantle 2100 cGy 8/14/98-8/27/98 mediastinal cone 1400 cGy Total mediastinal dose 3500 cGy  Chemotherapy Vincristine Pred Cyclophosphamide Bleomycin Vinblastine Doxorubicin Procarbazine  Screening - part of survivorship program at [facility] [place] -DXA 2009 normal -TFTs 2.01 recently -Patient reports yearly thyroid u/s and neck exam  #Diabetes Mellitus Type 2 Diagnosis- August 2022  How?: routine labs Current regimen: Metformin 500mg BID and tresiba 22 units (for past 7-8 months) PCP/prior endocrinologist: yes (last seen Last HgbA1c: 7.4% -> 6.7% last visit  Home blood sugars: 1-3x per day  Fasting 139-200 (PATIENT CONCERNED AS FASTING ELEVATED) Pre meals and post meals ,  Hypoglycemia: denies, but does feel some symptoms of sweats overnight ? hypo FH of diabetes: father, sister History of CAD: no History of CVA: no No history of pancreatitis, UTI, thyroid cancer  Exercise: nothing  eGFR: 84 Alb/creat: Follow with nephrology?  Last eye exam: due Evidence of retinopathy? no  Last foot exam: no Any issues? 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

## 2024-01-17 NOTE — PHYSICAL EXAM
[de-identified] : General: Patient appears well nourished without any distress  Cardio: RRR, no murmurs appreciated  Pulm: CTA b/l, no wheezes, no edema  Skin: No significant rashes or bruises noted Neuro: No focal deficits noted. No tremors GI: No pain with palpation

## 2024-01-17 NOTE — ASSESSMENT
[FreeTextEntry1] : 48 year old gentleman with PMH of Hodgkin's lymphoma, T2DM, HLD, presents for management of his diabetes  #T2DM -At target HbA1c 6.7% (due for repeat at next visit)  Patient to check BGs for 1 week at 2am to get sense if fasting elevated BGs are due to bridget phenomenon or due to hyperglycemia and inadequate insulin dosing  Continue 22 units of tresiba for now, with aim to adjust depending on 2am BGs Increase metformin 1g BID  Discussed alternative medication options such as GLP1 agonist and SGLT2 inhibitors with patient. Patient hesitant to trial any "newer" agents at this point in time. Is open to trialing a sulfonylurea if needed if insulin ceased and mildly elevated BGs.  #HLD Lipids prior to next visit  #Prior neck radiation -Ongoing surveillance for thyroid nodules [facility] [place] [facility] [place]Need annual neck examination and TFTs  *>35 minutes was spent counselling the patient and discussing treatment options. All questions were answered to the patient's satisfaction   Review in 4 months (keep appointment for MArch)

## 2024-01-17 NOTE — REVIEW OF SYSTEMS
[FreeTextEntry1] : Constitutional: Denies any fatigue, recent weight change, change in appetite Eyes: Denies any blurry vision, visual field defect, eye pain.  ENT: Denies any dysphagia, neck pain, hearing loss, nasal congestion  Resp: Denies any SOB, cough, wheezing  CV: Denies any chest pain, palpitations, edema  GI: Denies any nausea, vomiting, diarrhea or constipation Endo: Denies any polyuria, polydipsia, heat or cold intolerance MSK: Denies any joint pain, muscle weakness or joint stiffness Neuro: Denies any headaches, dizziness, tremors, pain/numbness

## 2024-01-24 ENCOUNTER — NON-APPOINTMENT (OUTPATIENT)
Age: 49
End: 2024-01-24

## 2024-03-12 RX ORDER — ALCOHOL ANTISEPTIC PADS
PADS, MEDICATED (EA) TOPICAL
Qty: 1 | Refills: 2 | Status: ACTIVE | COMMUNITY
Start: 2023-10-12 | End: 1900-01-01

## 2024-03-25 ENCOUNTER — APPOINTMENT (OUTPATIENT)
Dept: ENDOCRINOLOGY | Facility: CLINIC | Age: 49
End: 2024-03-25
Payer: COMMERCIAL

## 2024-03-25 VITALS
BODY MASS INDEX: 32.14 KG/M2 | WEIGHT: 264 LBS | OXYGEN SATURATION: 97 % | TEMPERATURE: 98 F | SYSTOLIC BLOOD PRESSURE: 149 MMHG | DIASTOLIC BLOOD PRESSURE: 81 MMHG | HEART RATE: 93 BPM

## 2024-03-25 DIAGNOSIS — E11.9 TYPE 2 DIABETES MELLITUS W/OUT COMPLICATIONS: ICD-10-CM

## 2024-03-25 LAB — HBA1C MFR BLD HPLC: 8.3

## 2024-03-25 PROCEDURE — 99215 OFFICE O/P EST HI 40 MIN: CPT

## 2024-03-25 PROCEDURE — 95251 CONT GLUC MNTR ANALYSIS I&R: CPT

## 2024-03-25 NOTE — ASSESSMENT
[FreeTextEntry1] : 48 year old gentleman with PMH of Hodgkin's lymphoma, T2DM, HLD, presents for management of his diabetes  #T2DM -At target HbA1c 6.7% -> today March 2024 -> 8.3% TODAY  Increase to 32 units of tresiba. Increase metformin 1g BID  Explained to patient that the elevated fasting BGs are likely 2/2 to the hyperglycemia post dinner and pre bed.  Discussed options with patient to assist in reducing these pre bed BGs - adjust the type of food with dinner (lower CHO), smaller portion, avoid snacks just prior to bed and consider exercise post meal.  Discussed alternative medication options such as GLP1 agonist and SGLT2 inhibitors with patient. Patient hesitant to trial any "newer" agents at this point in time. Is open to trialing a sulfonylurea if needed if insulin ceased and mildly elevated BGs.  #HLD Lipids today Target LDL <70  #Prior neck radiation -Ongoing surveillance for thyroid nodules [facility] survivorship program at [facility] [place] -Need annual neck examination and TFTs -TFTs today   #HTN BP elevated today - previously ok Urinary ACR today   Review in 4 months (review with CDE in 1 month)

## 2024-03-25 NOTE — HISTORY OF PRESENT ILLNESS
[FreeTextEntry1] : 48 year old gentleman with PMH of Hodgkin's lymphoma, T2DM, HLD, presents for management of his diabetes.  #Hodgkin stage IIB, nodular sclerosing -Dx age 23 1/23/98 -Off treatment >23 years -Treatment Rtx 7/28/98-8/13/98 mantle 2100 cGy 8/14/98-8/27/98 mediastinal cone 1400 cGy Total mediastinal dose 3500 cGy  Chemotherapy Vincristine Pred Cyclophosphamide Bleomycin Vinblastine Doxorubicin Procarbazine  Screening - part of survivorship program at [facility] [place] -DXA 2009 normal -TFTs 2.01 recently -Patient reports yearly thyroid u/s and neck exam  #Diabetes Mellitus Type 2 Diagnosis- August 2022 How?: routine labs Current regimen: Metformin 500mg BID (never tried at higher dose) and tresiba 27 units (last dose increase in end of Jan 2024) PCP/prior endocrinologist: yes (last seen Last HgbA1c: 7.4% -> 6.7% December 2023 ->  Home blood sugars: Started CGM dexcom G7   CGM downloaded  Very high 10% 49% high 41% in range 0% low and very low Waking up  >180-210 usually due to prebed hyperglycemia (pt reports eating late,high CHO dinner and also sometimes snacking pre bed)   Hypoglycemia: denies hypo FH of diabetes: father, sister History of CAD: no History of CVA: no No history of pancreatitis, UTI, thyroid cancer  Exercise: nothing  eGFR: 84 Alb/creat: np recent, due Follow with nephrology? No  Last eye exam: 2 months ago  Evidence of retinopathy? no  Last foot exam: no Any issues? 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

## 2024-03-26 LAB
ALBUMIN SERPL ELPH-MCNC: 4.6 G/DL
ALP BLD-CCNC: 57 U/L
ALT SERPL-CCNC: 42 U/L
ANION GAP SERPL CALC-SCNC: 10 MMOL/L
AST SERPL-CCNC: 27 U/L
BILIRUB SERPL-MCNC: 0.6 MG/DL
BUN SERPL-MCNC: 15 MG/DL
CALCIUM SERPL-MCNC: 9.6 MG/DL
CHLORIDE SERPL-SCNC: 102 MMOL/L
CHOLEST SERPL-MCNC: 167 MG/DL
CO2 SERPL-SCNC: 25 MMOL/L
CREAT SERPL-MCNC: 0.7 MG/DL
CREAT SPEC-SCNC: 166 MG/DL
EGFR: 114 ML/MIN/1.73M2
GLUCOSE SERPL-MCNC: 146 MG/DL
HDLC SERPL-MCNC: 33 MG/DL
LDLC SERPL CALC-MCNC: 78 MG/DL
MICROALBUMIN 24H UR DL<=1MG/L-MCNC: 1.8 MG/DL
MICROALBUMIN/CREAT 24H UR-RTO: 11 MG/G
NONHDLC SERPL-MCNC: 134 MG/DL
POTASSIUM SERPL-SCNC: 4.5 MMOL/L
PROT SERPL-MCNC: 7.6 G/DL
SODIUM SERPL-SCNC: 137 MMOL/L
TRIGL SERPL-MCNC: 352 MG/DL
TSH SERPL-ACNC: 2.25 UIU/ML

## 2024-04-01 ENCOUNTER — RX RENEWAL (OUTPATIENT)
Age: 49
End: 2024-04-01

## 2024-04-01 RX ORDER — INSULIN DEGLUDEC INJECTION 100 U/ML
100 INJECTION, SOLUTION SUBCUTANEOUS
Qty: 15 | Refills: 0 | Status: ACTIVE | COMMUNITY
Start: 2023-10-12 | End: 1900-01-01

## 2024-04-30 ENCOUNTER — APPOINTMENT (OUTPATIENT)
Dept: ENDOCRINOLOGY | Facility: CLINIC | Age: 49
End: 2024-04-30

## 2024-05-02 NOTE — ED ADULT NURSE NOTE - CAS EDN DISCHARGE ASSESSMENT
[facility]  Wound Care  [address] [place] [address]  Phone: [phone]  Fax: [phone]    [facility]  Wound Care  [address] [place] [id number]  Phone: [phone]  Fax:     
Alert and oriented to person, place and time

## 2024-06-02 RX ORDER — METFORMIN HYDROCHLORIDE 500 MG/1
500 TABLET, COATED ORAL
Qty: 360 | Refills: 0 | Status: ACTIVE | COMMUNITY
Start: 2024-06-02 | End: 1900-01-01

## 2024-07-11 ENCOUNTER — OFFICE (OUTPATIENT)
Dept: URBAN - METROPOLITAN AREA CLINIC 109 | Facility: CLINIC | Age: 49
Setting detail: OPHTHALMOLOGY
End: 2024-07-11
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
## 2024-07-11 PROCEDURE — NO SHOW FE NO SHOW FEE: Performed by: OPHTHALMOLOGY

## 2024-07-28 PROBLEM — Z85.71 HISTORY OF HODGKIN'S LYMPHOMA: Status: RESOLVED | Noted: 2020-03-12 | Resolved: 2024-07-28

## 2024-07-29 ENCOUNTER — APPOINTMENT (OUTPATIENT)
Dept: ENDOCRINOLOGY | Facility: CLINIC | Age: 49
End: 2024-07-29

## 2024-07-29 DIAGNOSIS — E11.9 TYPE 2 DIABETES MELLITUS W/OUT COMPLICATIONS: ICD-10-CM

## 2024-07-29 DIAGNOSIS — E78.5 HYPERLIPIDEMIA, UNSPECIFIED: ICD-10-CM

## 2024-07-31 NOTE — ASSESSMENT
[FreeTextEntry1] : 48 year old gentleman with PMH of Hodgkin's lymphoma, T2DM, HLD, presents for management of his diabetes  #T2DM -At target HbA1c 6.7% -> today March 2024 -> 8.3% March 2024 32 units of tresiba metformin 1g BID  Explained to patient that the elevated fasting BGs are likely 2/2 to the hyperglycemia post dinner and pre bed. Discussed options with patient to assist in reducing these pre bed BGs - adjust the type of food with dinner (lower CHO), smaller portion, avoid snacks just prior to bed and consider exercise post meal.  Discussed alternative medication options such as GLP1 agonist and SGLT2 inhibitors with patient. Patient hesitant to trial any "newer" agents at this point in time. Is open to trialing a sulfonylurea if needed if insulin ceased and mildly elevated BGs.  #HLD LDL in March elevated Target LDL <70  #Prior neck radiation -Ongoing surveillance for thyroid nodules [facility] survivorship program at [facility] [place] -Need annual neck examination and TFTs -TFTs in March WNL   #HTN BP elevated today - previously ok Urinary ACR March 11  Review in 3 months with NP and with me in 6 months

## 2024-07-31 NOTE — ASSESSMENT
[FreeTextEntry1] : 48 year old gentleman with PMH of Hodgkin's lymphoma, T2DM, HLD, presents for management of his diabetes  #T2DM -At target HbA1c 6.7% -> today March 2024 -> 8.3% March 2024 32 units of tresiba metformin 1g BID  Explained to patient that the elevated fasting BGs are likely 2/2 to the hyperglycemia post dinner and pre bed. Discussed options with patient to assist in reducing these pre bed BGs - adjust the type of food with dinner (lower CHO), smaller portion, avoid snacks just prior to bed and consider exercise post meal.  Discussed alternative medication options such as GLP1 agonist and SGLT2 inhibitors with patient. Patient hesitant to trial any "newer" agents at this point in time. Is open to trialing a sulfonylurea if needed if insulin ceased and mildly elevated BGs.  #HLD LDL in March elevated Target LDL <70  #Prior neck radiation -Ongoing surveillance for thyroid nodules [facility] survivorship program at [facility] [place] [place] -Need annual neck examination and TFTs -TFTs in March WNL   #HTN BP elevated today - previously ok Urinary ACR March 11  Review in 3 months with NP and with me in 6 months

## 2024-07-31 NOTE — HISTORY OF PRESENT ILLNESS
[FreeTextEntry1] : 49 year old gentleman with PMH of Hodgkin's lymphoma, T2DM, HLD, presents for management of his diabetes.  Interim history   #Hodgkin stage IIB, nodular sclerosing -Dx age 23 1/23/98 -Off treatment >23 years -Treatment Rtx 7/28/98-8/13/98 mantle 2100 cGy 8/14/98-8/27/98 mediastinal cone 1400 cGy Total mediastinal dose 3500 cGy  Chemotherapy Vincristine Pred Cyclophosphamide Bleomycin Vinblastine Doxorubicin Procarbazine  Screening - part [place] [facility] [place]DXA 2009 normal -TFTs 2.01 recently -Patient reports yearly thyroid u/s and neck exam  #Diabetes Mellitus Type 2 Diagnosis- August 2022 How?: routine labs Current regimen: Metformin 500mg BID (never tried at higher dose) and tresiba 27 units (last dose increase in end of Jan 2024) PCP/prior endocrinologist: yes (last seen Last HgbA1c: 7.4% -> 6.7% December 2023 -> Home blood sugars: Started CGM dexcom G7  CGM downloaded Very high 10% 49% high 41% in range 0% low and very low Waking up >180-210 usually due to prebed hyperglycemia (pt reports eating late,high CHO dinner and also sometimes snacking pre bed)   Hypoglycemia: denies hypo FH of diabetes: father, sister History of CAD: no History of CVA: no No history of pancreatitis, UTI, thyroid cancer  Exercise: nothing  eGFR: 84 Alb/creat: np recent, due Follow with nephrology? No  Last eye exam: 2 months ago Evidence of retinopathy? no  Last foot exam: no Any issues? 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

## 2024-07-31 NOTE — HISTORY OF PRESENT ILLNESS
[FreeTextEntry1] : 49 year old gentleman with PMH of Hodgkin's lymphoma, T2DM, HLD, presents for management of his diabetes.  Interim history   #Hodgkin stage IIB, nodular sclerosing -Dx age 23 1/23/98 -Off treatment >23 years -Treatment Rtx 7/28/98-8/13/98 mantle 2100 cGy 8/14/98-8/27/98 mediastinal cone 1400 cGy Total mediastinal dose 3500 cGy  Chemotherapy Vincristine Pred Cyclophosphamide Bleomycin Vinblastine Doxorubicin Procarbazine  Screening - part of [facility] program [facility] [place] [facility] -DXA 2009 normal -TFTs 2.01 recently -Patient reports yearly thyroid u/s and neck exam  #Diabetes Mellitus Type 2 Diagnosis- August 2022 How?: routine labs Current regimen: Metformin 500mg BID (never tried at higher dose) and tresiba 27 units (last dose increase in end of Jan 2024) PCP/prior endocrinologist: yes (last seen Last HgbA1c: 7.4% -> 6.7% December 2023 -> Home blood sugars: Started CGM dexcom G7  CGM downloaded Very high 10% 49% high 41% in range 0% low and very low Waking up >180-210 usually due to prebed hyperglycemia (pt reports eating late,high CHO dinner and also sometimes snacking pre bed)   Hypoglycemia: denies hypo FH of diabetes: father, sister History of CAD: no History of CVA: no No history of pancreatitis, UTI, thyroid cancer  Exercise: nothing  eGFR: 84 Alb/creat: np recent, due Follow with nephrology? No  Last eye exam: 2 months ago Evidence of retinopathy? no  Last foot exam: no Any issues? 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

## 2024-09-06 ENCOUNTER — RX RENEWAL (OUTPATIENT)
Age: 49
End: 2024-09-06

## 2024-09-16 ENCOUNTER — RX RENEWAL (OUTPATIENT)
Age: 49
End: 2024-09-16

## 2024-11-04 ENCOUNTER — APPOINTMENT (OUTPATIENT)
Dept: ENDOCRINOLOGY | Facility: CLINIC | Age: 49
End: 2024-11-04
Payer: COMMERCIAL

## 2024-11-04 VITALS
SYSTOLIC BLOOD PRESSURE: 127 MMHG | HEIGHT: 76 IN | TEMPERATURE: 98.1 F | BODY MASS INDEX: 32.15 KG/M2 | DIASTOLIC BLOOD PRESSURE: 90 MMHG | WEIGHT: 264 LBS | OXYGEN SATURATION: 97 % | HEART RATE: 98 BPM

## 2024-11-04 LAB — HBA1C MFR BLD HPLC: 7.7

## 2024-11-04 PROCEDURE — 99214 OFFICE O/P EST MOD 30 MIN: CPT

## 2024-11-04 PROCEDURE — 95251 CONT GLUC MNTR ANALYSIS I&R: CPT

## 2024-11-04 PROCEDURE — 83036 HEMOGLOBIN GLYCOSYLATED A1C: CPT | Mod: QW

## 2025-01-16 NOTE — ED PROVIDER NOTE - CONSTITUTIONAL NEGATIVE STATEMENT, MLM
Detail Level: Detailed
Size Of Lesion In Cm (Optional): 0
Morphology Per Location (Optional): MOHS
Size Of Lesion: 4mm x 4mm
no fever and no chills.

## 2025-01-23 ENCOUNTER — RX RENEWAL (OUTPATIENT)
Age: 50
End: 2025-01-23

## 2025-02-10 ENCOUNTER — RX RENEWAL (OUTPATIENT)
Age: 50
End: 2025-02-10

## 2025-04-17 ENCOUNTER — RX RENEWAL (OUTPATIENT)
Age: 50
End: 2025-04-17

## 2025-05-02 RX ORDER — ISOPROPYL ALCOHOL 70 ML/100ML
SWAB TOPICAL
Qty: 2 | Refills: 0 | Status: ACTIVE | COMMUNITY
Start: 2025-05-02 | End: 1900-01-01

## 2025-06-29 NOTE — ED PROVIDER NOTE - CROS ED NEURO ALL NEG
1744 RRT called and team arrived. Patient sating 80s. Respiratory present before RRT called.  Patient responding to questions but not fully/with correct answers. Patient currently on highflow per order. At 1748 Dr. Ram called off RRT and mentioned speaking to nephrology. Patient currently sating 91% at 1751. Patient sitting up in bed.     1811 order to transfer patient to ICU. Attempted to call report but they stated they will have to call me back.     1846 attempted to call ICU again but no one picked up.     1853 report given to DILAN Wright    1907 DILAN Sanchez attempted to call to give report to ICU but they stated they are getting report on other patients.    negative...